# Patient Record
Sex: FEMALE | Race: WHITE | NOT HISPANIC OR LATINO | Employment: OTHER | URBAN - NONMETROPOLITAN AREA
[De-identification: names, ages, dates, MRNs, and addresses within clinical notes are randomized per-mention and may not be internally consistent; named-entity substitution may affect disease eponyms.]

---

## 2018-11-02 ENCOUNTER — CONSULT (OUTPATIENT)
Dept: ONCOLOGY | Facility: CLINIC | Age: 68
End: 2018-11-02

## 2018-11-02 ENCOUNTER — APPOINTMENT (OUTPATIENT)
Dept: ONCOLOGY | Facility: HOSPITAL | Age: 68
End: 2018-11-02

## 2018-11-02 VITALS
HEIGHT: 62 IN | BODY MASS INDEX: 27.71 KG/M2 | DIASTOLIC BLOOD PRESSURE: 71 MMHG | HEART RATE: 75 BPM | RESPIRATION RATE: 18 BRPM | WEIGHT: 150.6 LBS | SYSTOLIC BLOOD PRESSURE: 158 MMHG | TEMPERATURE: 99 F | OXYGEN SATURATION: 98 %

## 2018-11-02 DIAGNOSIS — B18.2 CHRONIC HEPATITIS C WITHOUT HEPATIC COMA (HCC): ICD-10-CM

## 2018-11-02 DIAGNOSIS — K74.69 OTHER CIRRHOSIS OF LIVER (HCC): ICD-10-CM

## 2018-11-02 DIAGNOSIS — N18.30 STAGE 3 CHRONIC KIDNEY DISEASE (HCC): ICD-10-CM

## 2018-11-02 DIAGNOSIS — D61.818 PANCYTOPENIA (HCC): Primary | ICD-10-CM

## 2018-11-02 PROCEDURE — G0463 HOSPITAL OUTPT CLINIC VISIT: HCPCS | Performed by: INTERNAL MEDICINE

## 2018-11-02 PROCEDURE — 99205 OFFICE O/P NEW HI 60 MIN: CPT | Performed by: INTERNAL MEDICINE

## 2018-11-02 RX ORDER — LISINOPRIL 20 MG/1
20 TABLET ORAL DAILY
COMMUNITY
End: 2020-12-02

## 2018-11-02 RX ORDER — MECLIZINE HYDROCHLORIDE 25 MG/1
25 TABLET ORAL 3 TIMES DAILY PRN
COMMUNITY
End: 2020-12-02

## 2018-11-06 PROBLEM — N18.30 STAGE 3 CHRONIC KIDNEY DISEASE (HCC): Status: ACTIVE | Noted: 2018-11-06

## 2018-11-06 PROBLEM — B18.2 CHRONIC HEPATITIS C WITHOUT HEPATIC COMA: Status: ACTIVE | Noted: 2018-11-06

## 2018-11-06 PROBLEM — K74.69 OTHER CIRRHOSIS OF LIVER (HCC): Status: ACTIVE | Noted: 2018-11-06

## 2018-11-06 NOTE — PROGRESS NOTES
"Diamond Tapia  [id number]  1950      REASON FOR CONSULTATION:  Pancytopenia  Provide an opinion on any further workup or treatment                             REQUESTING PHYSICIAN:  Khushi Arora Elizabeth APRN    RECORDS OBTAINED:  Records of the patients history including those obtained from the referring provider were reviewed and summarized in detail.      History of Present Illness     This is a very pleasant 68-year-old female who was seen in consultation at the request of Khushi Arora Elizabeth APRN for evaluation of pancytopenia.  Patient is here by herself and lives in [place].  She is currently on disability however works part-time in [email or web address].  She has past medical history of hypertension, chronic kidney disease stage III, hepatitis C infection, liver cirrhosis and pancytopenia.  Patient unfortunately is a very poor historian and her care is fragmented with multiple specialist.  Unfortunately I do not have all the medical records to review.  Patient tells me that she has \"low blood counts\" for many years now.  She tells me that she has seen Dr. Ashton in [place] however nobody has told her why her counts are low.  She reports that Dr. Ashton wanted to do bone marrow aspiration biopsy however patient refuses this due to concern for osteoporosis.  She tells me that her low blood counts were detected on routine laboratory testing and she is not symptomatic.  She denies any lumps or bumps anywhere in the body.  Denies any unintentional weight loss or drenching night sweats or high fevers.  She tells me that she has history of hepatitis C infection and \"liver damage\" from it.  She follows with gastroenterologist in [place] and has received treatment for hepatitis C infection earlier this year.  She also reports that she follows with nephrologist for her chronic kidney disease.  I been asked to assist with management and evaluation of her pancytopenia.    Past Medical History: "   Diagnosis Date   • Anemia    • Hypertension    • Osteoporosis         Past Surgical History:   Procedure Laterality Date   • LEG SURGERY     • TONSILLECTOMY          No current outpatient prescriptions on file prior to visit.     No current facility-administered medications on file prior to visit.         ALLERGIES:    Allergies   Allergen Reactions   • Amoxicillin-Pot Clavulanate Diarrhea and Nausea And Vomiting   • Codeine Nausea And Vomiting   • Duloxetine Hcl Nausea And Vomiting   • Lorazepam Nausea And Vomiting        Social History     Social History   • Marital status:      Social History Main Topics   • Smoking status: Never Smoker   • Smokeless tobacco: Never Used   • Drug use: Unknown     Other Topics Concern   • Not on file        Family History   Problem Relation Age of Onset   • Heart disease Mother    • Cancer Father    • Lung cancer Father    • Brain cancer Father    • No Known Problems Sister    • Anemia Daughter    • Heart disease Daughter         Review of Systems       CONSTITUTIONAL: Fatigue + weakness + No weight loss, fever, chills.   HEENT: Eyes: No visual loss, blurred vision, double vision or yellow sclerae. Ears, Nose, Throat: No hearing loss, sneezing, congestion, runny nose or sore throat.  SKIN: No rash or itching.  CARDIOVASCULAR: No chest pain, chest pressure or chest discomfort. No palpitations or edema.  RESPIRATORY: No shortness of breath, cough or sputum.  GASTROINTESTINAL: No anorexia, nausea, vomiting or diarrhea. No abdominal pain or blood.  GENITOURINARY: Negative for urgency, frequency or dysuria.   NEUROLOGICAL: No headache, dizziness, syncope, paralysis, ataxia, numbness or tingling in the extremities. No change in bowel or bladder control.  MUSCULOSKELETAL: chronic joint pain +   HEMATOLOGIC: No anemia, bleeding or bruising.  LYMPHATICS: No enlarged nodes. No history of splenectomy.  PSYCHIATRIC: No history of depression or anxiety.  ENDOCRINOLOGIC: No reports of  "sweating, cold or heat intolerance. No polyuria or polydipsia.  ALLERGIES: No history of asthma, hives, eczema or rhinitis.      Objective     Vitals:    11/02/18 1101   BP: 158/71   Pulse: 75   Resp: 18   Temp: 99 °F (37.2 °C)   TempSrc: Temporal Artery    SpO2: 98%   Weight: 68.3 kg (150 lb 9.6 oz)   Height: 157.5 cm (62\")   PainSc: 4  Comment: thumb   PainLoc: Generalized     Current Status 11/2/2018   ECOG score 0       Physical Exam     General: Alert, awake, oriented.  Well dressed.  Not in apparent distress. Vitals as above.   HEAD: normocephalic, atraumatic.   EYES: PERRL, EOMI. Fundi normal, vision is grossly intact.  Neck: Supple, no adenopathy or thyromegaly.   Throat: normal oral cavity and pharynx. No inflammation, swelling, exudate, or lesions.  CARDIAC: Normal S1 and S2. No S3, S4 or murmurs. Rhythm is regular.Extremities are warm and well perfused.   LUNGS: Clear to auscultation and percussion without rales, rhonchi, wheezing or diminished breath sounds.  ABDOMEN: Positive bowel sounds. Soft, nondistended, nontender  . No guarding or rebound. No masses. Spleen tip palpable.   Back:  No bony tenderness.   EXTREMITIES: Diffuse osteoarthritis involving multiple joints.Peripheral pulses intact. No varicosities.  Skin: No rash or bruising.  Neurological: Grossly non-focal exam. No focal weakness. Gait: Normal.   Psych: Mood and affect normal. No hallucination or suicidal thoughts.   Lymphatics: No cervical, axillary, inguinal adenopathy      Outside old labs:  White blood cell 2.2, hemoglobin 10.8, hematocrit 31.8, MCV 88.1, platelet count 70,000, absolute neutrophils 1100, absolute lymphocyte 717, absolute monocyte 262, absolute eosinophils 90, absolute basophils 31 creatinine 1.40, BUN 32, total bilirubin 0.8, alkaline phosphatase 54, AST 33, AST 13, serum immunofixation without any monoclonal protein, urine immunofixation without any monoclonal protein, free light chain ratio normal at 1.55.     I have " reviewed old records and summarized them in HPI as well as assessment and plan section of this note.     In addition, I have requested additional records from her previous hematologist, nephrologist and GI - Dr Peres's office.     Diagnosis  (1) Pancytopenia  (2) Hepatitis C   (3) Liver cirrhosis?   (4) Splenomegaly   (5) chronic kidney disease stage III   (6) hemochromatosis?     All are new issues/problems for me.     Assessment/Plan     (1) Pancytopenia   · Patient with long-standing history of pancytopenia and was followed by Dr. Ashton in [place] for a while.  Unfortunately I do not have medical records available from their office to review however have requested this information.  · Based on patient's description it appears that she was offered bone marrow aspiration biopsy however refused it.  · Based on patient's description and review of medical record it appears that her pancytopenia is chronic.  · She denies any B symptoms such as unintentional weight loss or drenching night sweats or high fever.  She does not have any symptoms related to cytopenias.  · She does not have palpable lymphadenopathy.  A spleen tip was palpable below left costal margin.  No hepatomegaly.  · It is possible that her pancytopenia is related to liver disease as well as hypersplenism/splenomegaly.  However I need more information from her gastroenterologist and hematologist office.    · Patient tells me that she has significant co-pays and cannot afford a lot of testing.  I'll try to get the information from her prior physicians before making any testing recommendation.  If she has not had ultrasound abdomen recently I would recommend considering that to look for changes of cirrhosis as well as measures spleen size accurately.  I also discussed with her that without performing bone marrow aspiration biopsy I cannot rule out underlying lymphoproliferative disease (although my initial suspicious is low).  She understands this  clearly and agrees with our recommendation as outlined above.    (2) hepatitis C,(3) liver cirrhosis,(4) splenomegaly: She is following with .  She has finished treatment for hepatitis C earlier this year.  Recent HCV viral load undetectable.  I will request additional medical records from her gastroenterologist office for us to review.    (5) Chronic kidney disease, stage III: I have reviewed her nephrologist's office notes.  She has stage III chronic kidney disease from likely hypertension as well as prior nonsteroidal anti-inflammatory use.  She was checked for free light chain, serum and urine immunofixation and these tests were negative.      (6) Hemochromatosis (?):  Patient tells me that her gastroenterologist told her that she has hemochromatosis and stopped her oral iron.  She seems very concerned about not able to take oral iron which has helped her in the past for her anemia.  I will try to obtain records from her gastroenterologist office to confirm this. She will likely need fasting iron studies to evaluate her iron storage.     Thank you for involving me in Mr michel's care.     Please call us if any questions/concerns.     Nahun Celaya MD   Hematology Oncology

## 2018-11-08 DIAGNOSIS — D61.818 PANCYTOPENIA (HCC): ICD-10-CM

## 2018-11-08 DIAGNOSIS — Z14.8 HEMOCHROMATOSIS CARRIER: Primary | ICD-10-CM

## 2019-04-05 ENCOUNTER — APPOINTMENT (OUTPATIENT)
Dept: ONCOLOGY | Facility: HOSPITAL | Age: 69
End: 2019-04-05

## 2019-04-05 ENCOUNTER — OFFICE VISIT (OUTPATIENT)
Dept: ONCOLOGY | Facility: CLINIC | Age: 69
End: 2019-04-05

## 2019-04-05 VITALS
SYSTOLIC BLOOD PRESSURE: 181 MMHG | HEIGHT: 62 IN | BODY MASS INDEX: 26.46 KG/M2 | WEIGHT: 143.8 LBS | RESPIRATION RATE: 18 BRPM | DIASTOLIC BLOOD PRESSURE: 77 MMHG | TEMPERATURE: 98 F | HEART RATE: 69 BPM

## 2019-04-05 DIAGNOSIS — N18.30 STAGE 3 CHRONIC KIDNEY DISEASE (HCC): ICD-10-CM

## 2019-04-05 DIAGNOSIS — Z14.8 HEMOCHROMATOSIS CARRIER: ICD-10-CM

## 2019-04-05 DIAGNOSIS — D61.818 PANCYTOPENIA (HCC): Primary | ICD-10-CM

## 2019-04-05 PROCEDURE — 99213 OFFICE O/P EST LOW 20 MIN: CPT | Performed by: INTERNAL MEDICINE

## 2019-04-05 PROCEDURE — G0463 HOSPITAL OUTPT CLINIC VISIT: HCPCS | Performed by: INTERNAL MEDICINE

## 2019-04-05 NOTE — PROGRESS NOTES
Diamond Tapia  [id number]  1950    Subjective   Ms Tapia was seen in follow up for pancytopenia and hemochromatosis.   Denies any new health issues.   No new medications.   No new hospitalizations.   ROS as below.       Past Medical History, Past Surgical History, Social History, Family History have been reviewed and are without significant changes except as mentioned.    Review of Systems   CONSTITUTIONAL: Fatigue + weakness + No weight loss, fever, chills.   HEENT: Eyes: No visual loss, blurred vision, double vision or yellow sclerae. Ears, Nose, Throat: No hearing loss, sneezing, congestion, runny nose or sore throat.  SKIN: No rash or itching.  CARDIOVASCULAR: No chest pain, chest pressure or chest discomfort. No palpitations or edema.  RESPIRATORY: No shortness of breath, cough or sputum.  GASTROINTESTINAL: No anorexia, nausea, vomiting or diarrhea. No abdominal pain or blood.  GENITOURINARY: Negative for urgency, frequency or dysuria.   NEUROLOGICAL: No headache, dizziness, syncope, paralysis, ataxia, numbness or tingling in the extremities. No change in bowel or bladder control.  MUSCULOSKELETAL: chronic joint pain +   HEMATOLOGIC: No anemia, bleeding or bruising.  LYMPHATICS: No enlarged nodes. No history of splenectomy.  PSYCHIATRIC: No history of depression or anxiety.  ENDOCRINOLOGIC: No reports of sweating, cold or heat intolerance. No polyuria or polydipsia.  ALLERGIES: No history of asthma, hives, eczema or rhinitis.          Medications:  The current medication list was reviewed in the EMR    ALLERGIES:    Allergies   Allergen Reactions   • Amoxicillin-Pot Clavulanate Diarrhea and Nausea And Vomiting   • Codeine Nausea And Vomiting   • Duloxetine Hcl Nausea And Vomiting   • Lorazepam Nausea And Vomiting       Objective      There were no vitals filed for this visit.  Current Status 11/2/2018   ECOG score 0       Physical Exam      General: Alert, awake, oriented.  Well dressed.  Not in apparent  distress. Vitals as above.   HEAD: normocephalic, atraumatic.   EYES: PERRL, EOMI. Fundi normal, vision is grossly intact.  Neck: Supple, no adenopathy or thyromegaly.   Throat: normal oral cavity and pharynx. No inflammation, swelling, exudate, or lesions.  CARDIAC: Normal S1 and S2. No S3, S4 or murmurs. Rhythm is regular.Extremities are warm and well perfused.   LUNGS: Clear to auscultation and percussion without rales, rhonchi, wheezing or diminished breath sounds.  ABDOMEN: Positive bowel sounds. Soft, nondistended, nontender  . No guarding or rebound. No masses. Spleen tip palpable.   Back:  No bony tenderness.   EXTREMITIES: Diffuse osteoarthritis involving multiple joints.Peripheral pulses intact. No varicosities.  Skin: No rash or bruising.  Neurological: Grossly non-focal exam. No focal weakness. Gait: Normal.   Psych: Mood and affect normal. No hallucination or suicidal thoughts.   Lymphatics: No cervical, axillary, inguinal adenopathy              Labs (independently reviewed and summarized):  Labs on November 9, 2018 showed white blood cell count of 2.3, hemoglobin of 10.3, MCV of 88.5, platelet of 77,000, ferritin of 405, percent saturation 28, total iron 84, iron binding capacity 299.    Diagnosis  (1) Pancytopenia  (2) Hepatitis C   (3) Liver cirrhosis  (4) Splenomegaly   (5) chronic kidney disease stage III   (6) hemochromatosis        Assessment/Plan       (1) Pancytopenia: Stable, chronic.       · She denies any B symptoms such as unintentional weight loss or drenching night sweats or high fever.  She does not have any symptoms related to cytopenias.  · She does not have palpable lymphadenopathy.  A spleen tip was palpable below left costal margin.  No hepatomegaly.  · It is possible that her pancytopenia is related to liver disease as well as hypersplenism/splenomegaly.  · Discussed that I can not rule out low grade lymphoproliferative disease without bone marrow aspiration/biopsy. She  understands this. This was offered in the past; however, she refused.       2) hepatitis C,(3) liver cirrhosis,(4) splenomegaly: She is following with .  She has finished treatment for hepatitis C earlier this year.  Recent HCV viral load undetectable.  I will request additional medical records from her gastroenterologist office for us to review.      (5) Stage III CKD: Stable.  Follows with nephrology.     (6) Hemochromatosis: She is carrier. Iron studies are adequate.     Nahun Celaya MD       4/5/2019      CC:

## 2019-04-09 PROBLEM — M54.50 LOW BACK PAIN: Status: ACTIVE | Noted: 2019-04-09

## 2019-04-09 PROBLEM — I10 ESSENTIAL HYPERTENSION: Status: ACTIVE | Noted: 2019-04-09

## 2019-04-09 PROBLEM — M81.0 OSTEOPOROSIS: Status: ACTIVE | Noted: 2019-04-09

## 2019-04-09 PROBLEM — M25.569 KNEE PAIN: Status: ACTIVE | Noted: 2019-04-09

## 2019-04-09 PROBLEM — M19.90 OSTEOARTHRITIS: Status: ACTIVE | Noted: 2019-04-09

## 2019-04-09 PROBLEM — J30.2 SEASONAL ALLERGIES: Status: ACTIVE | Noted: 2019-04-09

## 2020-04-06 ENCOUNTER — APPOINTMENT (OUTPATIENT)
Dept: ONCOLOGY | Facility: CLINIC | Age: 70
End: 2020-04-06

## 2020-05-01 ENCOUNTER — TELEPHONE (OUTPATIENT)
Dept: ONCOLOGY | Facility: CLINIC | Age: 70
End: 2020-05-01

## 2020-05-01 DIAGNOSIS — Z14.8 HEMOCHROMATOSIS CARRIER: ICD-10-CM

## 2020-05-01 DIAGNOSIS — D61.818 PANCYTOPENIA (HCC): Primary | ICD-10-CM

## 2020-05-01 NOTE — TELEPHONE ENCOUNTER
Pt called earlier in regards to a new lab order.     Pt is requesting to be mailed a copy of the lab order as pt is needing the physical copy of the lab order to take down to [facility] in [place], [place].     Pt would like lab order mailed to address on file:   [id number] [facility] [id number]    Pt stated she is getting the labs drawn at [facility] as insurance covers 100% of the cost.    The order for the lab must have a fax number or [facility] will not perform the order.     Call pt at [phone] with any further questions.

## 2020-05-01 NOTE — TELEPHONE ENCOUNTER
PT WENT TO HAVE HER LABS DRAWN FOR HER UPCOMING APPT WITH DR RESENDEZ ON 20 THE ORDER SHE HAS  ON 20 SHE NEEDS A NEW ORDER PLEASE     PT CONTACT # [phone]

## 2020-05-18 ENCOUNTER — TELEMEDICINE - AUDIO (OUTPATIENT)
Dept: ONCOLOGY | Facility: CLINIC | Age: 70
End: 2020-05-18

## 2020-05-18 DIAGNOSIS — N18.4 ANEMIA OF CHRONIC KIDNEY FAILURE, STAGE 4 (SEVERE) (HCC): ICD-10-CM

## 2020-05-18 DIAGNOSIS — D63.1 ANEMIA OF CHRONIC KIDNEY FAILURE, STAGE 4 (SEVERE) (HCC): ICD-10-CM

## 2020-05-18 DIAGNOSIS — D61.818 PANCYTOPENIA (HCC): Primary | ICD-10-CM

## 2020-05-18 DIAGNOSIS — B18.2 CHRONIC HEPATITIS C WITHOUT HEPATIC COMA (HCC): ICD-10-CM

## 2020-05-18 PROCEDURE — 99442 PR PHYS/QHP TELEPHONE EVALUATION 11-20 MIN: CPT | Performed by: INTERNAL MEDICINE

## 2020-05-18 NOTE — PROGRESS NOTES
Diamond Tapia  [id number]  1950     Visit Consent  You have chosen to receive care through the use of telemedicine.  Telemedicine enables physicians at different locations to provide safe, effective, and convenient care through the use of technology.  As with any healthcare service, there are risks associated with the use of telemedicine, including equipment failure, poor connections, and  issues.     Do you understand the risks and benefits of telemedicine as I explained them to you? Yes  Have your questions regarding telemedicine been answered? Yes  Do you consent to the use of telemedicine in your medical care today? Yes      Subjective   Ms Tapia was seen in follow up for pancytopenia and hemochromatosis.   This was a telephone visit due to covid 19 pandemic.   Denies any new health issues.   No new medications.   No new hospitalizations.   We reviewed the result of labs at length.     ROS as below.       Past Medical History, Past Surgical History, Social History, Family History have been reviewed and are without significant changes except as mentioned.    Review of Systems   CONSTITUTIONAL: Fatigue + weakness + No weight loss, fever, chills.   HEENT: Eyes: No visual loss, blurred vision, double vision or yellow sclerae. Ears, Nose, Throat: No hearing loss, sneezing, congestion, runny nose or sore throat.  SKIN: No rash or itching.  CARDIOVASCULAR: No chest pain, chest pressure or chest discomfort. No palpitations or edema.  RESPIRATORY: No shortness of breath, cough or sputum.  GASTROINTESTINAL: No anorexia, nausea, vomiting or diarrhea. No abdominal pain or blood.  GENITOURINARY: Negative for urgency, frequency or dysuria.   NEUROLOGICAL: No headache, dizziness, syncope, paralysis, ataxia, numbness or tingling in the extremities. No change in bowel or bladder control.  MUSCULOSKELETAL: chronic joint pain +   HEMATOLOGIC: No anemia, bleeding or bruising.  LYMPHATICS: No enlarged  nodes. No history of splenectomy.  PSYCHIATRIC: No history of depression or anxiety.  ENDOCRINOLOGIC: No reports of sweating, cold or heat intolerance. No polyuria or polydipsia.  ALLERGIES: No history of asthma, hives, eczema or rhinitis.          Medications:  The current medication list was reviewed in the EMR    ALLERGIES:    Allergies   Allergen Reactions   • Amoxicillin-Pot Clavulanate Diarrhea and Nausea And Vomiting   • Codeine Nausea And Vomiting   • Duloxetine Hcl Nausea And Vomiting   • Lorazepam Nausea And Vomiting       Objective      There were no vitals filed for this visit.  Current Status 5/18/2020   ECOG score 0                 Labs (independently reviewed and summarized):  Labs on November 9, 2018 showed white blood cell count of 2.3, hemoglobin of 10.3, MCV of 88.5, platelet of 77,000, ferritin of 405, percent saturation 28, total iron 84, iron binding capacity 299.    Diagnosis  (1) Pancytopenia  (2) Hepatitis C   (3) Liver cirrhosis  (4) Splenomegaly   (5) chronic kidney disease stage IV  (6) hemochromatosis        Assessment/Plan       (1) Pancytopenia: Stable, chronic.       · She denies any B symptoms such as unintentional weight loss or drenching night sweats or high fever.  She does not have any symptoms related to cytopenias.  · She does not have palpable lymphadenopathy.  A spleen tip was palpable below left costal margin.  No hepatomegaly.  · It is possible that her pancytopenia is related to liver disease as well as hypersplenism/splenomegaly.  · Discussed that I can not rule out low grade lymphoproliferative disease without bone marrow aspiration/biopsy. She understands this. This was offered in the past; however, she refused.        Platelets 118k. Improved. No bleeding.   HG 9.8. Likely die to anemia of stage IV CKD. Baseline hg between 10-11.   Continue to monitor. If worsening anemia, I will consider procrit.     (2) hepatitis C,(3) liver cirrhosis,(4) splenomegaly: She is  following with .  She has finished treatment for hepatitis C earlier this year.  Recent HCV viral load undetectable.  I will request additional medical records from her gastroenterologist office for us to review.      (5) Stage IV CKD: Stable.  Follows with nephrology.     (6) Hemochromatosis: She is carrier. Iron studies are adequate.     This visit has been rescheduled as a phone visit to comply with patient safety concerns in accordance with CDC recommendations. Total time of discussion was 15 minutes.    Nahun Celaya MD       5/18/2020      CC:

## 2020-11-20 ENCOUNTER — TELEPHONE (OUTPATIENT)
Dept: ONCOLOGY | Facility: CLINIC | Age: 70
End: 2020-11-20

## 2020-11-20 NOTE — TELEPHONE ENCOUNTER
Patient called regarding her appt for 11/25, she has to have a paper copy of her LAB order and have them completed at a [facility], she has not received this? Can she get a call back about this.     Best call back number [phone]

## 2020-11-24 ENCOUNTER — TELEPHONE (OUTPATIENT)
Dept: ONCOLOGY | Facility: CLINIC | Age: 70
End: 2020-11-24

## 2020-11-24 NOTE — TELEPHONE ENCOUNTER
PT CALLED STATING SHE HAD SPOKEN TO A NURSE YESTERDAY (DID NOT REMEMBER HER NAME; THERE WASN'T A CALL DOC. IN CHART)) THAT SAID SHE WOULD EMAIL THE PTS LAB REQUESTS TO HER.     SHE HAS NOT RECEIVED THEM AND SHE GOES TO THE LAB IN THE MORNING    SHE SAID WE COULD ALSO FAX THE LAB REQUEST TO:  [phone] ATTN: JOSEPH    PT B/C: [phone]

## 2020-11-25 ENCOUNTER — APPOINTMENT (OUTPATIENT)
Dept: ONCOLOGY | Facility: CLINIC | Age: 70
End: 2020-11-25

## 2020-12-02 ENCOUNTER — OFFICE VISIT (OUTPATIENT)
Dept: ONCOLOGY | Facility: CLINIC | Age: 70
End: 2020-12-02

## 2020-12-02 VITALS
DIASTOLIC BLOOD PRESSURE: 68 MMHG | WEIGHT: 153.4 LBS | RESPIRATION RATE: 18 BRPM | TEMPERATURE: 97.6 F | HEIGHT: 62 IN | SYSTOLIC BLOOD PRESSURE: 147 MMHG | HEART RATE: 74 BPM | BODY MASS INDEX: 28.23 KG/M2

## 2020-12-02 DIAGNOSIS — B18.2 CHRONIC HEPATITIS C WITHOUT HEPATIC COMA (HCC): ICD-10-CM

## 2020-12-02 DIAGNOSIS — D61.818 PANCYTOPENIA (HCC): Primary | ICD-10-CM

## 2020-12-02 DIAGNOSIS — D63.1 ANEMIA OF CHRONIC KIDNEY FAILURE, STAGE 4 (SEVERE) (HCC): ICD-10-CM

## 2020-12-02 DIAGNOSIS — N18.4 ANEMIA OF CHRONIC KIDNEY FAILURE, STAGE 4 (SEVERE) (HCC): ICD-10-CM

## 2020-12-02 DIAGNOSIS — K74.69 OTHER CIRRHOSIS OF LIVER (HCC): ICD-10-CM

## 2020-12-02 PROCEDURE — 99213 OFFICE O/P EST LOW 20 MIN: CPT | Performed by: INTERNAL MEDICINE

## 2020-12-02 PROCEDURE — G0463 HOSPITAL OUTPT CLINIC VISIT: HCPCS | Performed by: INTERNAL MEDICINE

## 2020-12-02 RX ORDER — LOSARTAN POTASSIUM 100 MG/1
TABLET ORAL
COMMUNITY

## 2020-12-02 RX ORDER — LISINOPRIL AND HYDROCHLOROTHIAZIDE 25; 20 MG/1; MG/1
TABLET ORAL
COMMUNITY
End: 2021-11-25

## 2020-12-02 RX ORDER — AMLODIPINE BESYLATE 2.5 MG/1
TABLET ORAL
COMMUNITY
Start: 2020-10-07 | End: 2022-02-12

## 2020-12-02 NOTE — PROGRESS NOTES
"  Subjective     Diamondgalina Tapia seen in follow-up for pancytopenia and hemochromatosis.  No new health issues.  No new hospitalizations.  No new medications.  No new infections.  No new bleeding.  ROS as below.     Past Medical History, Past Surgical History, Social History, Family History have been reviewed and are without significant changes except as mentioned.    Review of Systems       CONSTITUTIONAL: fatigue + weakness + No weight loss, fever, chills.  HEENT: Eyes: No visual loss, blurred vision, double vision or yellow sclerae. Ears, Nose, Throat: No hearing loss, sneezing, congestion, runny nose or sore throat.  SKIN: No rash or itching.  CARDIOVASCULAR: No chest pain, chest pressure or chest discomfort. No palpitations or edema.  RESPIRATORY: No shortness of breath, cough or sputum.  GASTROINTESTINAL: No anorexia, nausea, vomiting or diarrhea. No abdominal pain or blood.  GENITOURINARY: Negative for urgency, frequency or dysuria.   NEUROLOGICAL: No headache, dizziness, syncope, paralysis, ataxia, numbness or tingling in the extremities. No change in bowel or bladder control.  MUSCULOSKELETAL: chronic joint pain + .  HEMATOLOGIC: anemia + No bleeding or bruising.  LYMPHATICS: No enlarged nodes. No history of splenectomy.  PSYCHIATRIC: No history of depression or anxiety.  ENDOCRINOLOGIC: No reports of sweating, cold or heat intolerance. No polyuria or polydipsia.  ALLERGIES: No history of asthma, hives, eczema or rhinitis.      Medications:  The current medication list was reviewed in the EMR    ALLERGIES:    Allergies   Allergen Reactions   • Amoxicillin-Pot Clavulanate Diarrhea and Nausea And Vomiting   • Codeine Nausea And Vomiting   • Duloxetine Hcl Nausea And Vomiting   • Lorazepam Nausea And Vomiting       Objective      Vitals:    12/02/20 0928   BP: 147/68   Pulse: 74   Resp: 18   Temp: 97.6 °F (36.4 °C)   TempSrc: Temporal   Weight: 69.6 kg (153 lb 6.4 oz)   Height: 157.5 cm (62\")   PainSc:   5 "   PainLoc: Generalized     Current Status 5/18/2020   ECOG score 0       Physical Exam      GENERAL: Alert, awake, oriented.  Well dressed.  Not in apparent distress. Vitals as above.   HEAD: Normocephalic, atraumatic.   EYES: PERRL, EOMI. vision is grossly intact.  Mild conjunctival pallor +   NECK: Supple, no adenopathy or thyromegaly.   THROAT: Normal oral cavity and pharynx. No inflammation, swelling, exudate, or lesions.  CARDIAC: Normal S1 and S2. No S3, S4 or murmurs. Rhythm is regular.  Extremities are warm and well perfused.   LUNGS: Clear to auscultation and percussion without rales, rhonchi, wheezing or diminished breath sounds.  ABDOMEN: Positive bowel sounds. Soft, nondistended, nontender. No guarding or rebound.   Mild splenomegaly +   BACK:  No bony tenderness.   EXTREMITIES: No significant deformity or joint abnormality.  Peripheral pulses intact. No varicosities.  SKIN: No rash or bruising.  NEUROLOGICAL: Grossly non-focal exam. No focal weakness. Gait: Normal.   PSYCH: Mood and affect normal. No hallucination or suicidal thoughts.   LYMPHATIC: No cervical, supraclavicular or axillary adenopathy.       RECENT LABS: Independently reviewed and summarized      Creatinine 1.6, BUN 30, white blood cell count 2.8, hemoglobin 10.1, place 101, iron saturation 30, ferritin 313              Diagnosis  (1) Pancytopenia  (2) Hepatitis C   (3) Liver cirrhosis  (4) Splenomegaly   (5) chronic kidney disease stage IV  (6) hemochromatosis    Assessment/Plan       (1) Pancytopenia    Blood counts have remained stable.  No B symptoms.  No palpable lymphadenopathy.  Mild splenomegaly.  Recommend continue monitoring.      (2) Hepatitis C (3) Liver cirrhosis(4) Splenomegaly     Follows with GI.  She has received treatment for hepatitis C in the past.  Continue monitoring.    (5) chronic kidney disease stage IV   stable  Follows with nephrology.    (6) hemochromatosis  She is carrier.  Iron studies are  adequate.      12/2/2020      CC:

## 2021-05-28 ENCOUNTER — TELEPHONE (OUTPATIENT)
Dept: ONCOLOGY | Facility: CLINIC | Age: 71
End: 2021-05-28

## 2021-05-28 DIAGNOSIS — D61.818 PANCYTOPENIA (HCC): Primary | ICD-10-CM

## 2021-06-02 ENCOUNTER — APPOINTMENT (OUTPATIENT)
Dept: ONCOLOGY | Facility: CLINIC | Age: 71
End: 2021-06-02

## 2021-06-17 ENCOUNTER — OFFICE VISIT (OUTPATIENT)
Dept: ONCOLOGY | Facility: CLINIC | Age: 71
End: 2021-06-17

## 2021-06-17 VITALS
SYSTOLIC BLOOD PRESSURE: 174 MMHG | BODY MASS INDEX: 28.6 KG/M2 | HEIGHT: 62 IN | TEMPERATURE: 98.3 F | RESPIRATION RATE: 18 BRPM | WEIGHT: 155.4 LBS | HEART RATE: 88 BPM | DIASTOLIC BLOOD PRESSURE: 74 MMHG

## 2021-06-17 DIAGNOSIS — D63.1 ANEMIA OF CHRONIC KIDNEY FAILURE, STAGE 4 (SEVERE) (HCC): Primary | Chronic | ICD-10-CM

## 2021-06-17 DIAGNOSIS — I10 ESSENTIAL HYPERTENSION: ICD-10-CM

## 2021-06-17 DIAGNOSIS — N18.4 ANEMIA OF CHRONIC KIDNEY FAILURE, STAGE 4 (SEVERE) (HCC): Primary | Chronic | ICD-10-CM

## 2021-06-17 DIAGNOSIS — D61.818 PANCYTOPENIA (HCC): Chronic | ICD-10-CM

## 2021-06-17 PROCEDURE — G0463 HOSPITAL OUTPT CLINIC VISIT: HCPCS | Performed by: INTERNAL MEDICINE

## 2021-06-17 PROCEDURE — 99213 OFFICE O/P EST LOW 20 MIN: CPT | Performed by: INTERNAL MEDICINE

## 2021-06-19 NOTE — PROGRESS NOTES
"Chief Complaint  Follow-up - pancytopenia, anemia     Subjective          Diamond Tapia presents to [facility] HEMATOLOGY AND ONCOLOGY  History of Present Illness     No new health issues since last visit.   No new medications.   No new hospitalization.   Feels well.   Her blood pressure is elevated in the clinic.  She reports compliance with her antihypertensive medications.      Objective   Vital Signs:   /74   Pulse 88   Temp 98.3 °F (36.8 °C) (Temporal)   Resp 18   Ht 157.5 cm (62.01\")   Wt 70.5 kg (155 lb 6.4 oz)   BMI 28.42 kg/m²     Physical Exam  Vitals and nursing note reviewed.   Constitutional:       Appearance: Normal appearance.   Cardiovascular:      Rate and Rhythm: Normal rate and regular rhythm.      Heart sounds: No murmur heard.     Pulmonary:      Effort: Pulmonary effort is normal. No respiratory distress.      Breath sounds: Normal breath sounds. No wheezing.   Abdominal:      General: There is no distension.      Palpations: Abdomen is soft.      Tenderness: There is no abdominal tenderness.      Comments: Mild splenomegaly   Neurological:      Mental Status: She is alert.   Psychiatric:         Mood and Affect: Mood normal.         Behavior: Behavior normal.         Thought Content: Thought content normal.        Result Review :   The following data was reviewed by: Dawood Celaya MD on 06/17/2021:         Recent labs reviewed.              Assessment and Plan    Diagnoses and all orders for this visit:    1. Anemia of chronic kidney failure, stage 4 (severe) (CMS/HCC) (Primary)    Chronic, stable.  Hemoglobin is lower than 10.  Iron studies are normal.  Recommend continue monitoring.  If hemoglobin declined  less than 10 we will consider erythropoietin stimulating agent.    2. Pancytopenia (CMS/HCC)    Chronic, stable  No B symptoms.  Mild splenomegaly likely secondary to cirrhosis of the liver.  No evidence of any hematological disorder.  Continue " monitoring.    3. Essential hypertension    Chronic issue with exacerbation.     Patient blood pressure is elevated in the clinic. She reports compliance with antihypertensive medications.  Instructed to check blood pressure at home and report to us or her primary care provider if persistently elevated blood pressure at home.  Discussed long-term hazards of uncontrolled high blood pressure which could include stroke, heart disease and kidney disease.  Educated and counseled.          Follow Up   No follow-ups on file.  Patient was given instructions and counseling regarding her condition or for health maintenance advice. Please see specific information pulled into the AVS if appropriate.

## 2021-11-24 ENCOUNTER — OFFICE VISIT (OUTPATIENT)
Dept: ONCOLOGY | Facility: CLINIC | Age: 71
End: 2021-11-24

## 2021-11-24 VITALS
BODY MASS INDEX: 29.83 KG/M2 | SYSTOLIC BLOOD PRESSURE: 140 MMHG | DIASTOLIC BLOOD PRESSURE: 60 MMHG | HEIGHT: 62 IN | HEART RATE: 94 BPM | TEMPERATURE: 97.1 F | WEIGHT: 162.1 LBS

## 2021-11-24 DIAGNOSIS — D63.1 ANEMIA OF CHRONIC KIDNEY FAILURE, STAGE 4 (SEVERE) (HCC): Chronic | ICD-10-CM

## 2021-11-24 DIAGNOSIS — D61.818 PANCYTOPENIA (HCC): Primary | Chronic | ICD-10-CM

## 2021-11-24 DIAGNOSIS — N18.4 ANEMIA OF CHRONIC KIDNEY FAILURE, STAGE 4 (SEVERE) (HCC): Chronic | ICD-10-CM

## 2021-11-24 PROCEDURE — G0463 HOSPITAL OUTPT CLINIC VISIT: HCPCS | Performed by: INTERNAL MEDICINE

## 2021-11-24 PROCEDURE — 99214 OFFICE O/P EST MOD 30 MIN: CPT | Performed by: INTERNAL MEDICINE

## 2021-11-24 RX ORDER — HYDRALAZINE HYDROCHLORIDE 25 MG/1
TABLET, FILM COATED ORAL
COMMUNITY
Start: 2021-10-13 | End: 2022-02-12

## 2021-11-24 RX ORDER — MECLIZINE HYDROCHLORIDE 25 MG/1
TABLET ORAL
COMMUNITY
Start: 2021-11-08 | End: 2022-12-09

## 2021-11-24 RX ORDER — AMLODIPINE BESYLATE 10 MG/1
50 TABLET ORAL
COMMUNITY

## 2021-11-24 RX ORDER — HYDROCHLOROTHIAZIDE 25 MG/1
TABLET ORAL
COMMUNITY
Start: 2021-11-15

## 2021-11-26 NOTE — PROGRESS NOTES
"Chief Complaint  Pancytopenia, anemia     Subjective          Diamond Tapia presents to [facility] HEMATOLOGY & ONCOLOGY  History of Present Illness     No new health issues since last visit.   No new medications.   No new hospitalization.  Continue to struggle with chronic fatigue.    Feels well otherwise.   Anemia is stable.         Objective   Vital Signs:   /60   Pulse 94   Temp 97.1 °F (36.2 °C)   Ht 157.5 cm (62\")   Wt 73.5 kg (162 lb 1.6 oz)   BMI 29.65 kg/m²     Physical Exam  Vitals and nursing note reviewed.   Constitutional:       Appearance: Normal appearance.   Neurological:      General: No focal deficit present.      Mental Status: She is alert and oriented to person, place, and time. Mental status is at baseline.   Psychiatric:         Mood and Affect: Mood normal.         Behavior: Behavior normal.         Thought Content: Thought content normal.        Result Review :   The following data was reviewed by: Dawood Celaya MD on 11/24/2021:                                                    Diamond Tapia reports a pain score of 5.  Given her pain assessment as noted, treatment options were discussed and the following options were decided upon as a follow-up plan to address the patient's pain: referral to Primary Care for assistance in pain treatment guidance.    Patient screened positive for depression based on a PHQ-9 score of 0 on 11/24/2021. Follow-up recommendations include: Suicide Risk Assessment performed.    Advance Care Planning   ACP discussion was declined by the patient. Patient does not have an advance directive, declines further assistance.    Assessment and Plan    Diagnoses and all orders for this visit:    1. Pancytopenia (HCC) (Primary)    Chronic, stable.   Secondary to cirrhosis.   No new infection or bleeding.   Follows with GI.   Continue to monitor.   CBC, CMP in 6 months.     2. Anemia of chronic kidney failure, stage 4 (severe) " (HCC)  -     CBC & Differential; Future  -     Comprehensive Metabolic Panel; Future  -     Ferritin; Future  -     Folate; Future  -     Vitamin B12; Future  -     Iron Profile; Future    Chronic, stable.   HG 9.9.   Stable, unchanged.   Iron studies are normal.   B12, folate is normal.   Given her hg is close to last year and close to 10, I recommend continue monitoring. If worsening, we will consider BLANK.         Follow Up   No follow-ups on file.  Patient was given instructions and counseling regarding her condition or for health maintenance advice. Please see specific information pulled into the AVS if appropriate.

## 2022-02-12 ENCOUNTER — HOSPITAL ENCOUNTER (EMERGENCY)
Facility: HOSPITAL | Age: 72
Discharge: HOME | End: 2022-02-12
Attending: FAMILY MEDICINE | Admitting: FAMILY MEDICINE

## 2022-02-12 ENCOUNTER — APPOINTMENT (OUTPATIENT)
Dept: GENERAL RADIOLOGY | Facility: HOSPITAL | Age: 72
End: 2022-02-12

## 2022-02-12 VITALS
RESPIRATION RATE: 20 BRPM | DIASTOLIC BLOOD PRESSURE: 71 MMHG | OXYGEN SATURATION: 98 % | HEART RATE: 98 BPM | SYSTOLIC BLOOD PRESSURE: 157 MMHG | TEMPERATURE: 98.3 F

## 2022-02-12 DIAGNOSIS — S22.32XA CLOSED FRACTURE OF ONE RIB OF LEFT SIDE, INITIAL ENCOUNTER: Primary | ICD-10-CM

## 2022-02-12 PROCEDURE — 71101 X-RAY EXAM UNILAT RIBS/CHEST: CPT

## 2022-02-12 PROCEDURE — 99282 EMERGENCY DEPT VISIT SF MDM: CPT

## 2022-02-12 RX ORDER — HYDRALAZINE HYDROCHLORIDE 50 MG/1
TABLET, FILM COATED ORAL EVERY 8 HOURS SCHEDULED
COMMUNITY
Start: 2021-10-12 | End: 2022-12-09

## 2022-02-12 RX ORDER — IRON,CARBONYL/ASCORBIC ACID 100-250 MG
TABLET ORAL
COMMUNITY

## 2022-02-12 RX ORDER — LORATADINE 10 MG/1
CAPSULE, LIQUID FILLED ORAL EVERY 24 HOURS
COMMUNITY

## 2022-02-12 RX ORDER — AMLODIPINE BESYLATE 10 MG/1
TABLET ORAL EVERY 24 HOURS
COMMUNITY
End: 2022-02-12

## 2022-02-12 NOTE — ED PROVIDER NOTES
Subjective     History provided by:  Patient   used: No      Patient is a 71 years old with past medical history of hypertension, pancytopenia, chronic kidney disease who presented here today because of pain on the left sided rib area.  She said they started about 2 days ago.  Denies any fever chills or sweating.  She says is painful to take a deep breath.  Denies any shortness of breath.  Review of Systems   All other systems reviewed and are negative.      Past Medical History:   Diagnosis Date   • Anemia    • Chronic kidney disease (CKD), stage III (moderate) (HCC)    • Hepatitis C    • Hypertension    • Osteoporosis    • Pancytopenia (HCC)        Allergies   Allergen Reactions   • Amoxicillin-Pot Clavulanate Diarrhea and Nausea And Vomiting   • Codeine Nausea And Vomiting   • Duloxetine Hcl Nausea And Vomiting   • Lorazepam Nausea And Vomiting       Past Surgical History:   Procedure Laterality Date   • DILATATION AND CURETTAGE     • LEG SURGERY     • TONSILLECTOMY         Family History   Problem Relation Age of Onset   • Heart disease Mother    • Cancer Father    • Lung cancer Father    • Brain cancer Father    • No Known Problems Sister    • Anemia Daughter    • Heart disease Daughter        Social History     Socioeconomic History   • Marital status:    Tobacco Use   • Smoking status: Never Smoker   • Smokeless tobacco: Never Used       /71   Pulse 98   Temp 98.3 °F (36.8 °C) (Oral)   Resp 20   SpO2 98%   Objective   Physical Exam  Vitals and nursing note reviewed.   Constitutional:       Appearance: Normal appearance. She is normal weight.   HENT:      Head: Normocephalic and atraumatic.      Right Ear: Tympanic membrane, ear canal and external ear normal.      Left Ear: Tympanic membrane, ear canal and external ear normal.      Nose: Nose normal.   Eyes:      Extraocular Movements: Extraocular movements intact.      Conjunctiva/sclera: Conjunctivae normal.      Pupils:  Pupils are equal, round, and reactive to light.   Cardiovascular:      Rate and Rhythm: Normal rate and regular rhythm.      Pulses: Normal pulses.      Heart sounds: Normal heart sounds.   Pulmonary:      Effort: Pulmonary effort is normal.      Breath sounds: Normal breath sounds.   Chest:   Breasts:      Left: Tenderness present.         Abdominal:      General: Abdomen is flat. Bowel sounds are normal.      Palpations: Abdomen is soft.   Musculoskeletal:         General: Normal range of motion.      Cervical back: Normal range of motion and neck supple.   Skin:     General: Skin is warm and dry.      Capillary Refill: Capillary refill takes less than 2 seconds.   Neurological:      General: No focal deficit present.      Mental Status: She is alert.         Procedures           ED Course           Labs Reviewed - No data to display    XR Ribs Left With PA Chest   Final Result   1. Minimal contour irregularity of the posterior left eighth rib,   could represent fracture, acute or more likely chronic in nature.   2. Minimal left base atelectasis         Electronically signed by:  Jayne Raymundo MD  2/12/2022 12:34 PM   CST Workstation: [id number]                                            [facility]    Final diagnoses:   Closed fracture of one rib of left side, initial encounter       ED Disposition  ED Disposition     ED Disposition Condition Comment    Discharge Stable           Khushi Arora, APRN  [address] [facility] [address]  [phone]    In 3 days           Medication List      Changed    amLODIPine 10 MG tablet  Commonly known as: NORVASC  What changed: Another medication with the same name was removed. Continue taking this medication, and follow the directions you see here.     Claritin 10 MG capsule  Generic drug: Loratadine  What changed: Another medication with the same name was removed. Continue taking this medication, and follow the directions you see here.     hydrALAZINE 50 MG  tablet  Commonly known as: APRESOLINE  What changed: Another medication with the same name was removed. Continue taking this medication, and follow the directions you see here.             Clovis Watts MD  02/12/22 0099

## 2022-02-12 NOTE — ED NOTES
Patient presents to ED with having left sided rib pain since Thursday after reaching for something. She has a hx of osteoporosis. Moving or breathing worsens the pain.      Terra Cordoba, RN  02/12/22 1200

## 2022-05-24 ENCOUNTER — LAB (OUTPATIENT)
Dept: ONCOLOGY | Facility: HOSPITAL | Age: 72
End: 2022-05-24

## 2022-05-24 ENCOUNTER — OFFICE VISIT (OUTPATIENT)
Dept: ONCOLOGY | Facility: CLINIC | Age: 72
End: 2022-05-24

## 2022-05-24 VITALS
BODY MASS INDEX: 26.34 KG/M2 | SYSTOLIC BLOOD PRESSURE: 144 MMHG | TEMPERATURE: 96.7 F | WEIGHT: 144 LBS | OXYGEN SATURATION: 98 % | RESPIRATION RATE: 18 BRPM | DIASTOLIC BLOOD PRESSURE: 72 MMHG | HEART RATE: 84 BPM

## 2022-05-24 DIAGNOSIS — D61.818 PANCYTOPENIA: ICD-10-CM

## 2022-05-24 DIAGNOSIS — M80.00XA AGE-RELATED OSTEOPOROSIS WITH CURRENT PATHOLOGICAL FRACTURE, INITIAL ENCOUNTER: ICD-10-CM

## 2022-05-24 DIAGNOSIS — N18.4 ANEMIA OF CHRONIC KIDNEY FAILURE, STAGE 4 (SEVERE): ICD-10-CM

## 2022-05-24 DIAGNOSIS — D63.1 ANEMIA OF CHRONIC KIDNEY FAILURE, STAGE 4 (SEVERE): Primary | ICD-10-CM

## 2022-05-24 DIAGNOSIS — D63.1 ANEMIA OF CHRONIC KIDNEY FAILURE, STAGE 4 (SEVERE): ICD-10-CM

## 2022-05-24 DIAGNOSIS — N18.4 ANEMIA OF CHRONIC KIDNEY FAILURE, STAGE 4 (SEVERE): Primary | ICD-10-CM

## 2022-05-24 PROCEDURE — G0463 HOSPITAL OUTPT CLINIC VISIT: HCPCS | Performed by: INTERNAL MEDICINE

## 2022-05-24 PROCEDURE — 99214 OFFICE O/P EST MOD 30 MIN: CPT | Performed by: INTERNAL MEDICINE

## 2022-05-24 NOTE — PROGRESS NOTES
Chief Complaint  Follow-up - anemia, pancytopenia     Subjective          Diamond Tapia presents to [facility] HEMATOLOGY & ONCOLOGY  History of Present Illness     Diamond Tapia was seen in follow up for pancytopenia, anemia.   Since her last visit she developed non-traumatic osteoporotic fracture.   Not a candidate for bisphosphonate due to dental issues.   No bleeding.     Objective   Vital Signs:  /72   Pulse 84   Temp 96.7 °F (35.9 °C)   Resp 18   Wt 65.3 kg (144 lb)   SpO2 98%   BMI 26.34 kg/m²   BMI has not been calculated during today's encounter.       Physical Exam  Vitals and nursing note reviewed.   Constitutional:       Appearance: Normal appearance.   Neurological:      General: No focal deficit present.      Mental Status: She is alert and oriented to person, place, and time. Mental status is at baseline.   Psychiatric:         Mood and Affect: Mood normal.         Behavior: Behavior normal.         Thought Content: Thought content normal.        Result Review :   The following data was reviewed by: Dawood Celaya MD on 05/24/2022:                Outside labs reviewed.   Result scanned in.   CBC, CMP, iron studies, b12 and folate level reviewed.       Diamond Tapia reports a pain score of 6.  Given her pain assessment as noted, treatment options were discussed and the following options were decided upon as a follow-up plan to address the patient's pain: referral to Primary Care for assistance in pain treatment guidance.    Patient screened negative for depression based on a PHQ-9 score of 0 on 5/24/2022.     Advance Care Planning   ACP discussion was declined by the patient. Patient does not have an advance directive, declines further assistance.           Assessment and Plan    Diagnoses and all orders for this visit:    1. Anemia of chronic kidney failure, stage 4 (severe) ([facility]) (Primary)  -     CBC & Differential; Future  -     Ferritin; Future  -      Iron Profile; Future    Chronic, stable.   Hg is 10.5.   Iron studies normal.   Continue to monitor.   Check CBC, ferritin, iron profile in 6 months.     2. Pancytopenia (HCC)    Chronic, stable.   Secondary to cirrhosis of liver   No new infection or bleeding.   Follows with GI.  Continue to monitor.   CBC, CMP in 6 months.     3. Age-related osteoporosis with current pathological fracture, initial encounter    Chronic issue with exacerbation.  Since her last visit she developed non-traumatic osteoporotic fracture.   Not a candidate for bisphosphonate due to dental issues.   Recommend if she gets her teeth extracted she could be a candidate for treatment.          Follow Up   No follow-ups on file.  Patient was given instructions and counseling regarding her condition or for health maintenance advice. Please see specific information pulled into the AVS if appropriate.

## 2022-05-31 ENCOUNTER — DOCUMENTATION (OUTPATIENT)
Dept: ONCOLOGY | Facility: HOSPITAL | Age: 72
End: 2022-05-31

## 2022-11-29 ENCOUNTER — APPOINTMENT (OUTPATIENT)
Dept: ONCOLOGY | Facility: HOSPITAL | Age: 72
End: 2022-11-29

## 2022-12-09 ENCOUNTER — OFFICE VISIT (OUTPATIENT)
Dept: ONCOLOGY | Facility: CLINIC | Age: 72
End: 2022-12-09

## 2022-12-09 VITALS
HEART RATE: 85 BPM | RESPIRATION RATE: 18 BRPM | DIASTOLIC BLOOD PRESSURE: 62 MMHG | OXYGEN SATURATION: 99 % | BODY MASS INDEX: 26.89 KG/M2 | WEIGHT: 147 LBS | SYSTOLIC BLOOD PRESSURE: 139 MMHG

## 2022-12-09 DIAGNOSIS — D61.818 PANCYTOPENIA: Primary | ICD-10-CM

## 2022-12-09 DIAGNOSIS — N18.4 ANEMIA OF CHRONIC KIDNEY FAILURE, STAGE 4 (SEVERE): ICD-10-CM

## 2022-12-09 DIAGNOSIS — D63.1 ANEMIA OF CHRONIC KIDNEY FAILURE, STAGE 4 (SEVERE): ICD-10-CM

## 2022-12-09 PROCEDURE — 99213 OFFICE O/P EST LOW 20 MIN: CPT | Performed by: INTERNAL MEDICINE

## 2022-12-09 PROCEDURE — G0463 HOSPITAL OUTPT CLINIC VISIT: HCPCS | Performed by: INTERNAL MEDICINE

## 2022-12-09 NOTE — PROGRESS NOTES
"Chief Complaint  Follow-up -pancytopenia,    Subjective        Diamond Tapia presents to [facility] HEMATOLOGY & ONCOLOGY  History of Present Illness     No new health issues since last visit.   No new medications.   No new hospitalization.   Feels well.       Objective   Vital Signs:  /62   Pulse 85   Resp 18   Wt 66.7 kg (147 lb)   SpO2 99%   BMI 26.89 kg/m²   Estimated body mass index is 26.89 kg/m² as calculated from the following:    Height as of 11/24/21: 157.5 cm (62\").    Weight as of this encounter: 66.7 kg (147 lb).          Physical Exam  Vitals and nursing note reviewed.   Constitutional:       Appearance: Normal appearance.   Neurological:      General: No focal deficit present.      Mental Status: She is alert and oriented to person, place, and time. Mental status is at baseline.   Psychiatric:         Mood and Affect: Mood normal.         Behavior: Behavior normal.         Thought Content: Thought content normal.        Result Review :      Labs independently reviewed and summarized.              Diamond Tapia reports a pain score of 0.  Given her pain assessment as noted, treatment options were discussed and the following options were decided upon as a follow-up plan to address the patient's pain: continuation of current treatment plan for pain.      Patient screened negative for depression based on a PHQ-9 score of 0 on 12/9/2022.     Advance Care Planning   ACP discussion was declined by the patient. Patient does not have an advance directive, declines further assistance.         Assessment and Plan   Diagnoses and all orders for this visit:    1. Pancytopenia (HCC) (Primary)  -     CBC (No Diff); Future    Chronic, stable.  Pancytopenia is likely secondary to cirrhosis of the liver.  No new infection or bleeding.  Follows with GI.  Continue to monitor.  CBC, CMP in 6 months.    2. Anemia of chronic kidney failure, stage 4 (severe) (HCC)  -     CBC (No " Diff); Future  -     Comprehensive Metabolic Panel; Future  -     Ferritin; Future  -     Iron Profile; Future    Chronic, stable.  Hemoglobin is 10.5.  Iron studies are normal.  Recommend continue monitoring.  CBC, CMP, ferritin, iron profile in 6 months           Follow Up   No follow-ups on file.  Patient was given instructions and counseling regarding her condition or for health maintenance advice. Please see specific information pulled into the AVS if appropriate.

## 2023-06-09 ENCOUNTER — OFFICE VISIT (OUTPATIENT)
Dept: ONCOLOGY | Facility: CLINIC | Age: 73
End: 2023-06-09
Payer: COMMERCIAL

## 2023-06-09 VITALS
HEART RATE: 86 BPM | WEIGHT: 146 LBS | OXYGEN SATURATION: 98 % | DIASTOLIC BLOOD PRESSURE: 70 MMHG | RESPIRATION RATE: 18 BRPM | BODY MASS INDEX: 26.7 KG/M2 | SYSTOLIC BLOOD PRESSURE: 131 MMHG

## 2023-06-09 DIAGNOSIS — D61.818 PANCYTOPENIA: ICD-10-CM

## 2023-06-09 DIAGNOSIS — D63.1 ANEMIA OF CHRONIC KIDNEY FAILURE, STAGE 4 (SEVERE): Primary | ICD-10-CM

## 2023-06-09 DIAGNOSIS — N18.4 ANEMIA OF CHRONIC KIDNEY FAILURE, STAGE 4 (SEVERE): Primary | ICD-10-CM
